# Patient Record
Sex: FEMALE | Race: WHITE | Employment: PART TIME | URBAN - METROPOLITAN AREA
[De-identification: names, ages, dates, MRNs, and addresses within clinical notes are randomized per-mention and may not be internally consistent; named-entity substitution may affect disease eponyms.]

---

## 2017-12-30 ENCOUNTER — HOSPITAL ENCOUNTER (EMERGENCY)
Age: 37
Discharge: HOME | End: 2017-12-30
Attending: EMERGENCY MEDICINE | Admitting: EMERGENCY MEDICINE
Payer: COMMERCIAL

## 2017-12-30 VITALS
SYSTOLIC BLOOD PRESSURE: 120 MMHG | HEIGHT: 65 IN | RESPIRATION RATE: 18 BRPM | WEIGHT: 220.8 LBS | OXYGEN SATURATION: 97 % | BODY MASS INDEX: 36.79 KG/M2 | DIASTOLIC BLOOD PRESSURE: 78 MMHG | TEMPERATURE: 98.2 F | HEART RATE: 81 BPM

## 2017-12-30 DIAGNOSIS — L98.9 SKIN LESION OF RIGHT ARM: Primary | ICD-10-CM

## 2017-12-30 LAB
APTT PPP: 26.9 SEC (ref 22.1–32.5)
BASOPHILS # BLD: 0 K/UL (ref 0–0.1)
BASOPHILS NFR BLD: 0 % (ref 0–1)
EOSINOPHIL # BLD: 0.3 K/UL (ref 0–0.4)
EOSINOPHIL NFR BLD: 3 % (ref 0–7)
ERYTHROCYTE [DISTWIDTH] IN BLOOD BY AUTOMATED COUNT: 12.6 % (ref 11.5–14.5)
HCT VFR BLD AUTO: 39.8 % (ref 35–47)
HGB BLD-MCNC: 13.7 G/DL (ref 11.5–16)
INR PPP: 1 (ref 0.9–1.1)
LYMPHOCYTES # BLD: 3.8 K/UL (ref 0.8–3.5)
LYMPHOCYTES NFR BLD: 44 % (ref 12–49)
MCH RBC QN AUTO: 30.2 PG (ref 26–34)
MCHC RBC AUTO-ENTMCNC: 34.4 G/DL (ref 30–36.5)
MCV RBC AUTO: 87.7 FL (ref 80–99)
MONOCYTES # BLD: 0.5 K/UL (ref 0–1)
MONOCYTES NFR BLD: 6 % (ref 5–13)
NEUTS SEG # BLD: 4 K/UL (ref 1.8–8)
NEUTS SEG NFR BLD: 47 % (ref 32–75)
PLATELET # BLD AUTO: 227 K/UL (ref 150–400)
PROTHROMBIN TIME: 10.4 SEC (ref 9–11.1)
RBC # BLD AUTO: 4.54 M/UL (ref 3.8–5.2)
THERAPEUTIC RANGE,PTTT: NORMAL SECS (ref 58–77)
WBC # BLD AUTO: 8.6 K/UL (ref 3.6–11)

## 2017-12-30 PROCEDURE — 99282 EMERGENCY DEPT VISIT SF MDM: CPT

## 2017-12-30 PROCEDURE — 36415 COLL VENOUS BLD VENIPUNCTURE: CPT | Performed by: PHYSICIAN ASSISTANT

## 2017-12-30 PROCEDURE — 85730 THROMBOPLASTIN TIME PARTIAL: CPT | Performed by: PHYSICIAN ASSISTANT

## 2017-12-30 PROCEDURE — 85610 PROTHROMBIN TIME: CPT | Performed by: PHYSICIAN ASSISTANT

## 2017-12-30 PROCEDURE — 85025 COMPLETE CBC W/AUTO DIFF WBC: CPT | Performed by: PHYSICIAN ASSISTANT

## 2017-12-30 NOTE — Clinical Note
Follow-up with regular doctor. Return for any new or worsening.  [facility], [address]

## 2017-12-31 NOTE — ED TRIAGE NOTES
Patient arrives to ED with c/o of a quarter sizes dark red bruise to her upper right arm, patient states no injury to arm and no pain.

## 2017-12-31 NOTE — DISCHARGE INSTRUCTIONS

## 2017-12-31 NOTE — ED PROVIDER NOTES
HPI Comments: This is a 41 yo  female presenting with complaint of a red discolored are on the rt arm noticed today by her . No hx of trauma. No associated pain, swelling or change to ROM. No hx of bruising or bleeding. Has intermittent bloody stool x months which is being evaluated by PCP. Also reports having thin hair and shorter than usual menstrual cycles twice this month. No fever, chills, headache, chest pain, SOB, abdominal pain, vomiting or urinary complaint. Denies anticoagulation use. Patient is a 40 y.o. female presenting with other event. The history is provided by the patient. Other   Pertinent negatives include no chest pain, no abdominal pain, no headaches and no shortness of breath. Past Medical History:   Diagnosis Date    Abnormal Pap smear 2008    Repeats normal    Genital herpes, unspecified        Past Surgical History:   Procedure Laterality Date    HX OTHER SURGICAL  1997    Widsom teeth extraction         Family History:   Problem Relation Age of Onset    Hypertension Mother     Asthma Mother     Cancer Father     Cancer Sister     Cancer Maternal Grandmother     Heart Disease Maternal Grandmother     Diabetes Maternal Grandmother     Heart Disease Maternal Grandfather     Diabetes Paternal Grandfather     Asthma Sister     Asthma Sister        Social History     Social History    Marital status:      Spouse name: N/A    Number of children: N/A    Years of education: N/A     Occupational History    Not on file. Social History Main Topics    Smoking status: Former Smoker     Quit date: 11/1/2013    Smokeless tobacco: Never Used    Alcohol use No      Comment: socially    Drug use: No    Sexual activity: Yes     Partners: Male     Birth control/ protection: None     Other Topics Concern    Not on file     Social History Narrative         ALLERGIES: Amoxicillin and Aspirin    Review of Systems   Constitutional: Negative.   Negative for chills, fatigue and fever. HENT: Negative. Negative for congestion, ear pain, facial swelling, rhinorrhea, sneezing and sore throat. Eyes: Negative for pain, discharge and itching. Respiratory: Negative for cough, chest tightness and shortness of breath. Cardiovascular: Negative. Negative for chest pain and leg swelling. Gastrointestinal: Positive for blood in stool. Negative for abdominal distention, abdominal pain, constipation, diarrhea, nausea and vomiting. Genitourinary: Negative for difficulty urinating, frequency and urgency. Skin: Positive for color change. Negative for rash. Thinning hair     Neurological: Negative for dizziness, numbness and headaches. All other systems reviewed and are negative. Vitals:    12/30/17 2057   BP: 141/90   Pulse: 83   Resp: 16   Temp: 97.6 °F (36.4 °C)   SpO2: 98%   Weight: 100.2 kg (220 lb 12.8 oz)   Height: 5' 5\" (1.651 m)            Physical Exam   Constitutional: She is oriented to person, place, and time. She appears well-developed and well-nourished. No distress. Well appearing  female in NAD   HENT:   Head: Normocephalic and atraumatic. Right Ear: External ear normal.   Left Ear: External ear normal.   Nose: Nose normal.   Mouth/Throat: Oropharynx is clear and moist. No oropharyngeal exudate. Eyes: Conjunctivae and EOM are normal. Pupils are equal, round, and reactive to light. Right eye exhibits no discharge. Left eye exhibits no discharge. No scleral icterus. Neck: Normal range of motion. Neck supple. Cardiovascular: Normal rate and regular rhythm. Exam reveals no gallop and no friction rub. No murmur heard. Pulmonary/Chest: Effort normal and breath sounds normal. She has no wheezes. She has no rales. Abdominal: Soft. Bowel sounds are normal. She exhibits no distension. There is no tenderness. There is no rebound and no guarding.    Musculoskeletal:        Arms:       Feet:    Neurological: She is alert and oriented to person, place, and time. No cranial nerve deficit. Coordination normal.   Skin: Skin is warm and dry. She is not diaphoretic. Psychiatric: She has a normal mood and affect. Her behavior is normal.   Nursing note and vitals reviewed. MDM  Number of Diagnoses or Management Options  Diagnosis management comments: 39 yo  female with complaint of discolored lesion to the RUE. ? Contusion vs ITP amongst others  Plan  CBC  PT/INR  PTT       Amount and/or Complexity of Data Reviewed  Clinical lab tests: ordered and reviewed      ED Course       Procedures         Progress note  nml platelet count and PT/INr and PTT. Lori Alejo, [address]    Patient's results have been reviewed with them. Patient and/or family have verbally conveyed their understanding and agreement of the patient's signs, symptoms, diagnosis, treatment and prognosis and additionally agree to follow up as recommended or return to the Emergency Room should their condition change prior to follow-up. Discharge instructions have also been provided to the patient with some educational information regarding their diagnosis as well a list of reasons why they would want to return to the ER prior to their follow-up appointment should their condition change. Lori Alejo, [address]    A/P  Skin lesion: Follow-up with regular doctor. Return for any new or worsening.  [facility], [address]

## 2017-12-31 NOTE — ED NOTES
Went over discharge instructions with pt. Pt verbalized understanding. Pt ambulated out of department with steady gait.

## 2022-07-07 ENCOUNTER — OFFICE VISIT (OUTPATIENT)
Dept: ORTHOPEDIC SURGERY | Age: 42
End: 2022-07-07
Payer: COMMERCIAL

## 2022-07-07 VITALS — HEIGHT: 65 IN | WEIGHT: 200 LBS | BODY MASS INDEX: 33.32 KG/M2

## 2022-07-07 DIAGNOSIS — M54.50 CHRONIC BILATERAL LOW BACK PAIN WITHOUT SCIATICA: Primary | ICD-10-CM

## 2022-07-07 DIAGNOSIS — G89.29 CHRONIC BILATERAL LOW BACK PAIN WITHOUT SCIATICA: Primary | ICD-10-CM

## 2022-07-07 PROCEDURE — 99204 OFFICE O/P NEW MOD 45 MIN: CPT | Performed by: STUDENT IN AN ORGANIZED HEALTH CARE EDUCATION/TRAINING PROGRAM

## 2022-07-07 RX ORDER — LORATADINE 10 MG/1
10 TABLET ORAL DAILY
COMMUNITY

## 2022-07-07 RX ORDER — DICLOFENAC SODIUM 75 MG/1
75 TABLET, DELAYED RELEASE ORAL 2 TIMES DAILY WITH MEALS
Qty: 60 TABLET | Refills: 0 | Status: SHIPPED | OUTPATIENT
Start: 2022-07-07 | End: 2022-08-04

## 2022-07-07 RX ORDER — METFORMIN HYDROCHLORIDE 1000 MG/1
1000 TABLET ORAL 2 TIMES DAILY
COMMUNITY
Start: 2022-05-05

## 2022-07-07 RX ORDER — ESCITALOPRAM OXALATE 5 MG/1
TABLET ORAL
COMMUNITY

## 2022-07-07 RX ORDER — HYDROXYZINE 25 MG/1
25 TABLET, FILM COATED ORAL 3 TIMES DAILY
COMMUNITY
Start: 2022-05-05

## 2022-07-07 RX ORDER — CYCLOBENZAPRINE HCL 5 MG
5 TABLET ORAL
Qty: 30 TABLET | Refills: 1 | Status: SHIPPED | OUTPATIENT
Start: 2022-07-07

## 2022-07-07 NOTE — PROGRESS NOTES
Julianne Moralez (: 1980) is a 43 y.o. female, patient, here for evaluation of the following chief complaint(s):  Back Pain (Low Back, Bilateral Hips)       ASSESSMENT/PLAN:  Below is the assessment and plan developed based on review of pertinent history, physical exam, labs, studies, and medications. Patient presents today for evaluation of chronic bilateral low back pain, right greater than left with intermittent radiation of pain into the posterior aspect of her right lower extremity. Radiologic findings reviewed in detail with the patient. Some weakness noted on physical exam, as detailed below. She has tried and failed conservative management to include physical therapy and over-the-counter acetaminophen and anti-inflammatories. At this point, I would like for her to obtain a lumbar MRI to evaluate for compressive pathology. Additionally, I prescribed diclofenac and Flexeril. She will follow-up to discuss results of her MRI. 1. Chronic bilateral low back pain without sciatica  -     XR SPINE LUMBAR BEND/FLEXION EXTENSION; Future      No follow-ups on file. SUBJECTIVE/OBJECTIVE:  Julianne Moralez (: 1980) is a 43 y.o. female. No flowsheet data found. Patient presents for evaluation of bilateral low back pain, right greater than left, which is been ongoing since being home for [facility] quarantine. She reports intermittent radiation of pain into her right buttock, and occasionally down the posterior aspect of her leg. She has had formal physical therapy without any significant improvement of her symptoms. She takes over-the-counter acetaminophen and anti-inflammatories without much significant relief of her symptoms. She denies any new lower extremity weakness, states she is chronic knee problems which cause her leg weakness. Denies acute changes in bowel or bladder control.     Imaging:    XR Results (most recent):  Results from Appointment encounter on 22    XR SPINE LUMBAR BEND/FLEXION EXTENSION    Narrative  Bar flexion-extension views were reviewed today. Maintained lumbar lordosis. No gross instability on flexion-extension. AP and lateral views of the lumbar spine taken at [facility] reviewed today reveal mild disc degeneration, at L5-S1. Well-maintained lumbar lordosis. No obvious acute fractures or lytic lesions. Allergies   Allergen Reactions    Amoxicillin Hives and Swelling    Aspirin Hives and Swelling       Current Outpatient Medications   Medication Sig    soy isofla/blk cohosh/mag bark (ESTROVEN PO) Estroven    omeprazole magnesium (PRILOSEC PO) Prilosec    MAGNESIUM CITRATE PO magnesium    escitalopram oxalate (LEXAPRO) 5 mg tablet escitalopram 5 mg tablet   Take 1 tablet every day by oral route.  hydrOXYzine HCL (ATARAX) 25 mg tablet Take 25 mg by mouth three (3) times daily.  loratadine (CLARITIN) 10 mg tablet Take 10 mg by mouth daily.  metFORMIN (GLUCOPHAGE) 1,000 mg tablet Take 1,000 mg by mouth two (2) times a day.  oxycodone-acetaminophen (PERCOCET) 5-325 mg per tablet Take 1 tablet by mouth every four (4) hours as needed for Pain. (Patient not taking: Reported on 7/7/2022)    pseudoephedrine (SUDAFED) 30 mg tablet Take 30 mg by mouth every four (4) hours as needed for Congestion. (Patient not taking: Reported on 7/7/2022)     No current facility-administered medications for this visit.        Past Medical History:   Diagnosis Date    Abnormal Pap smear 2008    Repeats normal    Genital herpes, unspecified         Past Surgical History:   Procedure Laterality Date    HX OTHER SURGICAL  1997    Widsom teeth extraction       Family History   Problem Relation Age of Onset    Hypertension Mother     Asthma Mother     Cancer Father     Cancer Sister     Cancer Maternal Grandmother     Heart Disease Maternal Grandmother     Diabetes Maternal Grandmother     Heart Disease Maternal Grandfather     Diabetes Sandy Aguirreshahida Asthma Sister     Asthma Sister         Social History     Tobacco Use    Smoking status: Former Smoker     Quit date: 2013     Years since quittin.6    Smokeless tobacco: Never Used   Substance Use Topics    Alcohol use: No     Comment: socially    Drug use: No        Review of Systems   Musculoskeletal: Positive for arthralgias, back pain and myalgias. All other systems reviewed and are negative. Vitals:  Ht 5' 5\" (1.651 m)   Wt 200 lb (90.7 kg)   BMI 33.28 kg/m²    Body mass index is 33.28 kg/m². Physical Exam  Neurologic  Sensory  Light Touch - Intact - Globally. Overall Assessment of Muscle Strength and Tone reveals  Lower Extremities - Right Iliopsoas - 4/5. Left Iliopsoas - 5/5. Right Tibialis Anterior - 4/5. Left Tibialis Anterior - 5/5. Right Gastroc-Soleus - 5/5. Left Gastroc-Soleus - 5/5. Right EHL - 5/5. Left EHL - 5/5. General Assessment of Reflexes  Right Ankle - Clonus is not present. Left Ankle - Clonus is not present. Reflexes (Dermatomes)  2/2 Normal - Left Achilles (L5-S2), Left Knee (L2-4), Right Achilles (L5-S2) and Right Knee (L2-4). Musculoskeletal  Global Assessment  Examination of related systems reveals - well-developed, well-nourished, in no acute distress, alert and oriented x 3. Gait and Station - normal gait and station and normal posture. Right Lower Extremity - normal strength and tone, normal range of motion without pain and no instability, subluxation or laxity. Left Lower Extremity - normal strength and tone, normal range of motion without pain and no instability, subluxation or laxity. Spine/Ribs/Pelvis  Cervical Spine - Examination of the cervical spine reveals - no tenderness to palpation, no pain, no swelling, edema or erythema, normal cervical spine movements and normal sensation.  Thoracic (Dorsal) Spine - Examination of the thoracic spine reveals - no tenderness over thoracic vertebrae, no pain, normal sensation and normal thoracic spine movements. Lumbosacral Spine - Examination of the lumbosacral spine reveals - no known fractures or deformities. Inspection and Palpation - Tenderness - moderate. Assessment of pain reveals the following findings - The pain is characterized as - moderate. Location - pain refers to lower back bilaterally. ROJM - Trunk Extension - 15 degrees. Lumbar Spine Flexion - 35 °. Lumbosacral Spine - Functional Testing - Babinski Test negative, Prone Knee Bending Test negative, Slump Test negative, Straight Leg Raising Test negative. Dr. Edgard Santiago was available for immediate consult during this encounter. An electronic signature was used to authenticate this note.   -- GIO Hill

## 2022-07-07 NOTE — PROGRESS NOTES
1. Have you been to the ER, urgent care clinic since your last visit? Hospitalized since your last visit? No    2. Have you seen or consulted any other health care providers outside of the [address] [place] [address] since your last visit? Include any pap smears or colon screening.  No    Chief Complaint   Patient presents with    Back Pain     Low Back, Bilateral Hips

## 2022-08-04 RX ORDER — DICLOFENAC SODIUM 75 MG/1
TABLET, DELAYED RELEASE ORAL
Qty: 60 TABLET | Refills: 0 | Status: SHIPPED | OUTPATIENT
Start: 2022-08-04

## 2022-09-09 ENCOUNTER — OFFICE VISIT (OUTPATIENT)
Dept: ORTHOPEDIC SURGERY | Age: 42
End: 2022-09-09
Payer: COMMERCIAL

## 2022-09-09 VITALS — WEIGHT: 220 LBS | BODY MASS INDEX: 36.65 KG/M2 | HEIGHT: 65 IN

## 2022-09-09 DIAGNOSIS — G89.29 CHRONIC BILATERAL LOW BACK PAIN WITHOUT SCIATICA: Primary | ICD-10-CM

## 2022-09-09 DIAGNOSIS — M54.50 CHRONIC BILATERAL LOW BACK PAIN WITHOUT SCIATICA: Primary | ICD-10-CM

## 2022-09-09 PROCEDURE — 99213 OFFICE O/P EST LOW 20 MIN: CPT | Performed by: PHYSICIAN ASSISTANT

## 2022-09-09 NOTE — PROGRESS NOTES
1. Have you been to the ER, urgent care clinic since your last visit? Hospitalized since your last visit? No    2. Have you seen or consulted any other health care providers outside of the [address] [place] [address] since your last visit? Include any pap smears or colon screening.  No    Chief Complaint   Patient presents with    Abnormal MRI     MRI RESULTS

## 2022-09-12 NOTE — PROGRESS NOTES
Kell Hudson (: 1980) is a 43 y.o. female patient here for evaluation of the following chief complaint(s):  Abnormal MRI (MRI RESULTS)         ASSESSMENT/PLAN:  Below is the assessment and plan developed based on review of pertinent history, physical exam, labs, studies, and medications. 1. Chronic bilateral low back pain without sciatica  -     REFERRAL TO PAIN MANAGEMENT; Future  -     REFERRAL TO PHYSICAL THERAPY; Future      The patient's radiologic findings have been reviewed with her in detail today. She has no significant compressive pathology. She does have moderate facet arthropathy at L5-S1, and we have discussed various treatment options. I think that she is an ideal candidate for bilateral L5-S1 facet injections. She also like to resume outpatient physical therapy. We will see her back for a follow-up visit on an as-needed basis. Return if symptoms worsen or fail to improve. SUBJECTIVE/OBJECTIVE:  Kell Hudson (: 1980) is a 43 y.o. female who presents today for the following:  Chief Complaint   Patient presents with    Abnormal MRI     MRI RESULTS        HPI   Ms. Pattie Monteiro returns today for a follow-up visit. She has persistent low back pain that has been chronic for the past 2 years. Her leg symptoms are fleeting and not as bothersome currently. She has tried outpatient physical therapy since her last office visit which provided her with only temporary and limited relief. She has been using prescription Diclofenac and Flexeril as needed with limited relief. She is here today for MRI follow-up. Her history as noted below. Patient presents for evaluation of bilateral low back pain, right greater than left, which is been ongoing since being home for [facility] quarantine. She reports intermittent radiation of pain into her right buttock, and occasionally down the posterior aspect of her leg.   She has had formal physical therapy without any significant improvement of her symptoms. She takes over-the-counter acetaminophen and anti-inflammatories without much significant relief of her symptoms. She denies any new lower extremity weakness, states she is chronic knee problems which cause her leg weakness. Denies acute changes in bowel or bladder control. IMAGING:  XR Results (most recent):  Results from Appointment encounter on 07/07/22    XR SPINE LUMBAR BEND/FLEXION EXTENSION    Narrative  Bar flexion-extension views were reviewed today. Maintained lumbar lordosis. No gross instability on flexion-extension. MRI Results (most recent):  Results from Appointment encounter on 07/22/22    MRI LUMB SPINE WO CONT    Narrative  EXAM: MRI LUMB SPINE WO CONT    INDICATION: Chronic low back pain. COMPARISON: None    TECHNIQUE: MR imaging of the lumbar spine was performed using the following  sequences: sagittal T1, T2, STIR;  axial T1, T2 performed on a 3 Shikha magnet. CONTRAST:  None. FINDINGS:    There is normal alignment of the lumbar spine. Vertebral body heights are  maintained. Mild degenerative bone marrow edema in the posterior elements of L4  and L5 is secondary to facet arthrosis. No marrow replacing process. Discs are  within normal limits. The conus medullaris terminates at L2. Signal and caliber of the distal spinal  cord are within normal limits. Mild diffuse muscle atrophy. Lower thoracic spine: No herniation or stenosis. L1-L2: No herniation or stenosis. L2-L3: No herniation or stenosis. L3-L4: No herniation or stenosis. L4-L5: Diffuse disc bulge, facet arthrosis, and thickened ligamentum flavum. Mild central spinal canal stenosis. L5-S1: No herniation or stenosis. Moderate facet arthrosis. No spondylolysis. Impression  Facet arthrosis and degenerative bone marrow edema from L5-S1 likely cause the  pain. L4-5 mild central spinal canal stenosis.        Allergies   Allergen Reactions    Amoxicillin Hives and Swelling    Aspirin Hives and Swelling       Current Outpatient Medications   Medication Sig    diclofenac EC (VOLTAREN) 75 mg EC tablet TAKE 1 TABLET BY MOUTH TWICE A DAY WITH MEALS    soy isofla/blk cohosh/mag bark (ESTROVEN PO) Estroven    omeprazole magnesium (PRILOSEC PO) Prilosec    MAGNESIUM CITRATE PO magnesium    escitalopram oxalate (LEXAPRO) 5 mg tablet escitalopram 5 mg tablet   Take 1 tablet every day by oral route. hydrOXYzine HCL (ATARAX) 25 mg tablet Take 25 mg by mouth three (3) times daily. loratadine (CLARITIN) 10 mg tablet Take 10 mg by mouth daily. metFORMIN (GLUCOPHAGE) 1,000 mg tablet Take 1,000 mg by mouth two (2) times a day. cyclobenzaprine (FLEXERIL) 5 mg tablet Take 1 Tablet by mouth three (3) times daily as needed for Muscle Spasm(s). oxycodone-acetaminophen (PERCOCET) 5-325 mg per tablet Take 1 tablet by mouth every four (4) hours as needed for Pain.    pseudoephedrine (SUDAFED) 30 mg tablet Take 30 mg by mouth every four (4) hours as needed for Congestion. No current facility-administered medications for this visit. Past Medical History:   Diagnosis Date    Abnormal Pap smear     Repeats normal    Genital herpes, unspecified         Past Surgical History:   Procedure Laterality Date    HX OTHER SURGICAL      Widsom teeth extraction       Family History   Problem Relation Age of Onset    Hypertension Mother     Asthma Mother     Cancer Father     Cancer Sister     Cancer Maternal Grandmother     Heart Disease Maternal Grandmother     Diabetes Maternal Grandmother     Heart Disease Maternal Grandfather     Diabetes Paternal Grandfather     Asthma Sister     Asthma Sister         Social History     Tobacco Use    Smoking status: Former     Types: Cigarettes     Quit date: 2013     Years since quittin.8    Smokeless tobacco: Never   Substance Use Topics    Alcohol use: No     Comment: socially        Review of Systems   Constitutional: Negative.     Respiratory: Negative. Cardiovascular: Negative. Gastrointestinal: Negative. Endocrine: Negative. Genitourinary: Negative. Musculoskeletal:  Positive for arthralgias, back pain and myalgias. Skin: Negative. Allergic/Immunologic: Negative. Hematological: Negative. Psychiatric/Behavioral: Negative. All other systems reviewed and are negative. ROS    Positive for: Musculoskeletal  Last edited by Corey Causey RN on 9/9/2022 10:10 AM.         No flowsheet data found. Vitals:  Ht 5' 5\" (1.651 m)   Wt 220 lb (99.8 kg)   BMI 36.61 kg/m²    Body mass index is 36.61 kg/m². Physical Exam    Neurologic  Sensory  Light Touch - Intact - Globally. Overall Assessment of Muscle Strength and Tone reveals  Lower Extremities - Right Iliopsoas - 5/5. Left Iliopsoas - 5/5. Right Tibialis Anterior - 5/5. Left Tibialis Anterior - 5/5. Right Gastroc-Soleus - 5/5. Left Gastroc-Soleus - 5/5. Right EHL - 5/5. Left EHL - 5/5. General Assessment of Reflexes  Right Ankle - Clonus is not present. Left Ankle - Clonus is not present. Reflexes (Dermatomes)  2/2 Normal - Left Achilles (L5-S2), Left Knee (L2-4), Right Achilles (L5-S2) and Right Knee (L2-4). Musculoskeletal  Global Assessment  Examination of related systems reveals - well-developed, well-nourished, in no acute distress, alert and oriented x 3. Gait and Station - normal gait and station and normal posture. Right Lower Extremity - normal strength and tone, normal range of motion without pain and no instability, subluxation or laxity. Left Lower Extremity - normal strength and tone, normal range of motion without pain and no instability, subluxation or laxity. Spine/Ribs/Pelvis  Cervical Spine - Examination of the cervical spine reveals - no tenderness to palpation, no pain, no swelling, edema or erythema, normal cervical spine movements and normal sensation.  Thoracic (Dorsal) Spine - Examination of the thoracic spine reveals - no tenderness over thoracic vertebrae, no pain, normal sensation and normal thoracic spine movements. Lumbosacral Spine - Examination of the lumbosacral spine reveals - no known fractures or deformities. Inspection and Palpation - Tenderness - moderate. Assessment of pain reveals the following findings - The pain is characterized as - moderate. Location - pain refers to lower back bilaterally. ROJM - Trunk Extension - 15 degrees. Lumbar Spine Flexion - 35 °. Lumbosacral Spine - Functional Testing - Babinski Test negative, Prone Knee Bending Test negative, Slump Test negative, Straight Leg Raising Test negative. Dr. Vangie Morejon was available for immediate consult during this encounter. An electronic signature was used to authenticate this note.   -- GIO Betancourt

## 2025-07-21 ENCOUNTER — TRANSCRIBE ORDERS (OUTPATIENT)
Facility: HOSPITAL | Age: 45
End: 2025-07-21

## 2025-07-21 DIAGNOSIS — M62.08 DIASTASIS RECTI: Primary | ICD-10-CM
